# Patient Record
Sex: FEMALE | Race: WHITE | NOT HISPANIC OR LATINO | Employment: FULL TIME | URBAN - METROPOLITAN AREA
[De-identification: names, ages, dates, MRNs, and addresses within clinical notes are randomized per-mention and may not be internally consistent; named-entity substitution may affect disease eponyms.]

---

## 2022-07-08 LAB
HUMAN PAPILLOMAVIRUS (HPV): NORMAL
PAP RECOMMENDATION EXT: NORMAL
PAP SMEAR: NORMAL

## 2023-02-07 ENCOUNTER — OFFICE VISIT (OUTPATIENT)
Dept: PRIMARY CARE CLINIC | Facility: CLINIC | Age: 58
End: 2023-02-07
Payer: COMMERCIAL

## 2023-02-07 VITALS
SYSTOLIC BLOOD PRESSURE: 140 MMHG | DIASTOLIC BLOOD PRESSURE: 92 MMHG | OXYGEN SATURATION: 98 % | WEIGHT: 169.38 LBS | TEMPERATURE: 98 F | HEART RATE: 68 BPM | HEIGHT: 63 IN | BODY MASS INDEX: 30.01 KG/M2

## 2023-02-07 DIAGNOSIS — Z12.12 SCREENING FOR MALIGNANT NEOPLASM OF THE RECTUM: ICD-10-CM

## 2023-02-07 DIAGNOSIS — G44.209 TENSION HEADACHE: ICD-10-CM

## 2023-02-07 DIAGNOSIS — I10 ESSENTIAL HYPERTENSION, BENIGN: Primary | ICD-10-CM

## 2023-02-07 DIAGNOSIS — E03.9 PRIMARY HYPOTHYROIDISM: ICD-10-CM

## 2023-02-07 DIAGNOSIS — R73.9 SERUM GLUCOSE INCREASED: ICD-10-CM

## 2023-02-07 DIAGNOSIS — Z13.220 SCREENING CHOLESTEROL LEVEL: ICD-10-CM

## 2023-02-07 DIAGNOSIS — Z11.59 ENCOUNTER FOR HEPATITIS C SCREENING TEST FOR LOW RISK PATIENT: ICD-10-CM

## 2023-02-07 PROCEDURE — 99214 OFFICE O/P EST MOD 30 MIN: CPT | Mod: S$GLB,,, | Performed by: INTERNAL MEDICINE

## 2023-02-07 PROCEDURE — 3077F SYST BP >= 140 MM HG: CPT | Mod: S$GLB,,, | Performed by: INTERNAL MEDICINE

## 2023-02-07 PROCEDURE — 3008F BODY MASS INDEX DOCD: CPT | Mod: S$GLB,,, | Performed by: INTERNAL MEDICINE

## 2023-02-07 PROCEDURE — 3080F PR MOST RECENT DIASTOLIC BLOOD PRESSURE >= 90 MM HG: ICD-10-PCS | Mod: S$GLB,,, | Performed by: INTERNAL MEDICINE

## 2023-02-07 PROCEDURE — 1160F PR REVIEW ALL MEDS BY PRESCRIBER/CLIN PHARMACIST DOCUMENTED: ICD-10-PCS | Mod: S$GLB,,, | Performed by: INTERNAL MEDICINE

## 2023-02-07 PROCEDURE — 1159F MED LIST DOCD IN RCRD: CPT | Mod: S$GLB,,, | Performed by: INTERNAL MEDICINE

## 2023-02-07 PROCEDURE — 3008F PR BODY MASS INDEX (BMI) DOCUMENTED: ICD-10-PCS | Mod: S$GLB,,, | Performed by: INTERNAL MEDICINE

## 2023-02-07 PROCEDURE — 99214 PR OFFICE/OUTPT VISIT, EST, LEVL IV, 30-39 MIN: ICD-10-PCS | Mod: S$GLB,,, | Performed by: INTERNAL MEDICINE

## 2023-02-07 PROCEDURE — 3080F DIAST BP >= 90 MM HG: CPT | Mod: S$GLB,,, | Performed by: INTERNAL MEDICINE

## 2023-02-07 PROCEDURE — 3077F PR MOST RECENT SYSTOLIC BLOOD PRESSURE >= 140 MM HG: ICD-10-PCS | Mod: S$GLB,,, | Performed by: INTERNAL MEDICINE

## 2023-02-07 PROCEDURE — 1159F PR MEDICATION LIST DOCUMENTED IN MEDICAL RECORD: ICD-10-PCS | Mod: S$GLB,,, | Performed by: INTERNAL MEDICINE

## 2023-02-07 PROCEDURE — 1160F RVW MEDS BY RX/DR IN RCRD: CPT | Mod: S$GLB,,, | Performed by: INTERNAL MEDICINE

## 2023-02-07 RX ORDER — LOSARTAN POTASSIUM 25 MG/1
50 TABLET ORAL DAILY
COMMUNITY
End: 2023-02-07 | Stop reason: DRUGHIGH

## 2023-02-07 RX ORDER — BUTALBITAL, ACETAMINOPHEN, CAFFEINE AND CODEINE PHOSPHATE 300; 50; 40; 30 MG/1; MG/1; MG/1; MG/1
CAPSULE ORAL EVERY 4 HOURS
COMMUNITY
End: 2023-03-03

## 2023-02-07 RX ORDER — LOSARTAN POTASSIUM AND HYDROCHLOROTHIAZIDE 12.5; 5 MG/1; MG/1
1 TABLET ORAL DAILY
Qty: 90 TABLET | Refills: 3 | Status: SHIPPED | OUTPATIENT
Start: 2023-02-07 | End: 2024-02-08 | Stop reason: SDUPTHER

## 2023-02-07 RX ORDER — LEVOTHYROXINE SODIUM 100 UG/1
100 TABLET ORAL
COMMUNITY
End: 2023-10-09 | Stop reason: SDUPTHER

## 2023-02-07 NOTE — PROGRESS NOTES
Subjective:       Patient ID: Tierney Alvarado is a 57 y.o. female.    Chief Complaint: Hypertension (F/u on BP,refills), Thyroid Problem (On 100mcg/Needs labs), and Tension Headache (Few times a year.The current medical regimen is effective;  continue present plan and medications. With fioricets)      Patient is established already .......... presents today for a f/u visit , for management of the chronic conditions.        Hypertension  This is a chronic problem. The current episode started more than 1 year ago. The problem has been gradually worsening since onset. The problem is uncontrolled. Associated symptoms include headaches. There are no associated agents to hypertension. Risk factors for coronary artery disease include dyslipidemia, family history, sedentary lifestyle and obesity. Past treatments include angiotensin blockers. The current treatment provides moderate improvement. Identifiable causes of hypertension include a thyroid problem.   Thyroid Problem    Review of Systems   Neurological:  Positive for headaches.        Rare tension like Has with changes in temp       Objective:      Physical Exam  Vitals and nursing note reviewed.   Constitutional:       Appearance: Normal appearance. She is obese.   Cardiovascular:      Rate and Rhythm: Normal rate and regular rhythm.   Pulmonary:      Effort: Pulmonary effort is normal.      Breath sounds: Normal breath sounds.   Musculoskeletal:      Cervical back: Normal range of motion and neck supple.   Neurological:      Mental Status: She is alert.       Assessment:       1. Essential hypertension, benign  Overview:  Hypertension ROS: taking medications as instructed, no medication side effects noted, no TIA's, no chest pain on exertion, no dyspnea on exertion and no swelling of ankles.   New concerns: none.   Hypertension Exam: BP noted to be well controlled today in office, S1, S2 normal, no gallop, no murmur, chest clear, no JVD, no HSM, no edema.   Lab  review: orders written for new lab studies as appropriate; see orders.   Assessment:  Hypertension poorly controlled.   Plan: Orders and follow up as documented in patient record..      Assessment & Plan:  Add HCZ  Check labs and  sched f/u in few weeks    Orders:  -     losartan-hydrochlorothiazide 50-12.5 mg (HYZAAR) 50-12.5 mg per tablet; Take 1 tablet by mouth once daily.  Dispense: 90 tablet; Refill: 3  -     Comprehensive Metabolic Panel; Future; Expected date: 02/07/2023  -     Microalbumin/Creatinine Ratio, Urine; Future; Expected date: 02/07/2023    2. Screening cholesterol level  -     Lipid Panel; Future; Expected date: 02/07/2023    3. Primary hypothyroidism  Overview:  Thyroid ROS: denies fatigue, weight changes, heat/cold intolerance, bowel/skin changes or CVS symptoms.   No results found for: TSH  Exam: thyroid is normal in size without nodules or tenderness.   Assessment:  hypothyroidism well controlled.   Plan: current treatment plan effective, no change in therapy.      Assessment & Plan:  Recheck TFTs    Orders:  -     TSH; Future; Expected date: 02/07/2023    4. Serum glucose increased  -     Hemoglobin A1C; Future; Expected date: 02/07/2023    5. Encounter for hepatitis C screening test for low risk patient  -     Hepatitis C Antibody; Future; Expected date: 02/07/2023    6. Screening for malignant neoplasm of the rectum  -     Cologuard Screening (Multitarget Stool DNA); Future; Expected date: 02/07/2023    7. Tension headache  Overview:  Happened few times a year with change of temperature with a    Assessment & Plan:  Controlled with small dose of Fioricet               Plan:       1. Essential hypertension, benign  Overview:  Hypertension ROS: taking medications as instructed, no medication side effects noted, no TIA's, no chest pain on exertion, no dyspnea on exertion and no swelling of ankles.   New concerns: none.   Hypertension Exam: BP noted to be well controlled today in office, S1, S2  normal, no gallop, no murmur, chest clear, no JVD, no HSM, no edema.   Lab review: orders written for new lab studies as appropriate; see orders.   Assessment:  Hypertension poorly controlled.   Plan: Orders and follow up as documented in patient record..      Assessment & Plan:  Add HCZ  Check labs and  sched f/u in few weeks    Orders:  -     losartan-hydrochlorothiazide 50-12.5 mg (HYZAAR) 50-12.5 mg per tablet; Take 1 tablet by mouth once daily.  Dispense: 90 tablet; Refill: 3  -     Comprehensive Metabolic Panel; Future; Expected date: 02/07/2023  -     Microalbumin/Creatinine Ratio, Urine; Future; Expected date: 02/07/2023    2. Screening cholesterol level  -     Lipid Panel; Future; Expected date: 02/07/2023    3. Primary hypothyroidism  Overview:  Thyroid ROS: denies fatigue, weight changes, heat/cold intolerance, bowel/skin changes or CVS symptoms.   No results found for: TSH  Exam: thyroid is normal in size without nodules or tenderness.   Assessment:  hypothyroidism well controlled.   Plan: current treatment plan effective, no change in therapy.      Assessment & Plan:  Recheck TFTs    Orders:  -     TSH; Future; Expected date: 02/07/2023    4. Serum glucose increased  -     Hemoglobin A1C; Future; Expected date: 02/07/2023    5. Encounter for hepatitis C screening test for low risk patient  -     Hepatitis C Antibody; Future; Expected date: 02/07/2023    6. Screening for malignant neoplasm of the rectum  -     Cologuard Screening (Multitarget Stool DNA); Future; Expected date: 02/07/2023    7. Tension headache  Overview:  Happened few times a year with change of temperature with a    Assessment & Plan:  Controlled with small dose of Fioricet

## 2023-02-27 ENCOUNTER — CLINICAL SUPPORT (OUTPATIENT)
Dept: PRIMARY CARE CLINIC | Facility: CLINIC | Age: 58
End: 2023-02-27
Payer: COMMERCIAL

## 2023-02-27 DIAGNOSIS — Z11.59 ENCOUNTER FOR HEPATITIS C SCREENING TEST FOR LOW RISK PATIENT: ICD-10-CM

## 2023-02-27 DIAGNOSIS — R73.9 SERUM GLUCOSE INCREASED: ICD-10-CM

## 2023-02-27 DIAGNOSIS — E03.9 PRIMARY HYPOTHYROIDISM: ICD-10-CM

## 2023-02-27 DIAGNOSIS — I10 ESSENTIAL HYPERTENSION, BENIGN: ICD-10-CM

## 2023-02-27 DIAGNOSIS — Z13.220 SCREENING CHOLESTEROL LEVEL: ICD-10-CM

## 2023-02-27 LAB
ALBUMIN SERPL BCP-MCNC: 3.8 G/DL (ref 3.5–5.2)
ALP SERPL-CCNC: 95 U/L (ref 55–135)
ALT SERPL W/O P-5'-P-CCNC: 18 U/L (ref 10–44)
ANION GAP SERPL CALC-SCNC: 7 MMOL/L (ref 8–16)
AST SERPL-CCNC: 18 U/L (ref 10–40)
BILIRUB SERPL-MCNC: 0.3 MG/DL (ref 0.1–1)
BUN SERPL-MCNC: 13 MG/DL (ref 6–20)
CALCIUM SERPL-MCNC: 9.2 MG/DL (ref 8.7–10.5)
CHLORIDE SERPL-SCNC: 107 MMOL/L (ref 95–110)
CHOLEST SERPL-MCNC: 194 MG/DL (ref 120–199)
CHOLEST/HDLC SERPL: 3.4 {RATIO} (ref 2–5)
CO2 SERPL-SCNC: 26 MMOL/L (ref 23–29)
CREAT SERPL-MCNC: 0.8 MG/DL (ref 0.5–1.4)
EST. GFR  (NO RACE VARIABLE): >60 ML/MIN/1.73 M^2
ESTIMATED AVG GLUCOSE: 108 MG/DL (ref 68–131)
GLUCOSE SERPL-MCNC: 98 MG/DL (ref 70–110)
HBA1C MFR BLD: 5.4 % (ref 4–5.6)
HDLC SERPL-MCNC: 57 MG/DL (ref 40–75)
HDLC SERPL: 29.4 % (ref 20–50)
LDLC SERPL CALC-MCNC: 124.4 MG/DL (ref 63–159)
NONHDLC SERPL-MCNC: 137 MG/DL
POTASSIUM SERPL-SCNC: 4.1 MMOL/L (ref 3.5–5.1)
PROT SERPL-MCNC: 6.9 G/DL (ref 6–8.4)
SODIUM SERPL-SCNC: 140 MMOL/L (ref 136–145)
TRIGL SERPL-MCNC: 63 MG/DL (ref 30–150)
TSH SERPL DL<=0.005 MIU/L-ACNC: 1.32 UIU/ML (ref 0.4–4)

## 2023-02-27 PROCEDURE — 84443 ASSAY THYROID STIM HORMONE: CPT | Performed by: INTERNAL MEDICINE

## 2023-02-27 PROCEDURE — 86803 HEPATITIS C AB TEST: CPT | Performed by: INTERNAL MEDICINE

## 2023-02-27 PROCEDURE — 83036 HEMOGLOBIN GLYCOSYLATED A1C: CPT | Performed by: INTERNAL MEDICINE

## 2023-02-27 PROCEDURE — 80053 COMPREHEN METABOLIC PANEL: CPT | Performed by: INTERNAL MEDICINE

## 2023-02-27 PROCEDURE — 80061 LIPID PANEL: CPT | Performed by: INTERNAL MEDICINE

## 2023-02-28 LAB — HCV AB SERPL QL IA: NORMAL

## 2023-03-01 ENCOUNTER — PATIENT MESSAGE (OUTPATIENT)
Dept: ADMINISTRATIVE | Facility: HOSPITAL | Age: 58
End: 2023-03-01
Payer: COMMERCIAL

## 2023-03-01 ENCOUNTER — PATIENT OUTREACH (OUTPATIENT)
Dept: ADMINISTRATIVE | Facility: HOSPITAL | Age: 58
End: 2023-03-01
Payer: COMMERCIAL

## 2023-03-01 NOTE — PROGRESS NOTES
Population Health Outreach.     The following record(s)  below were uploaded for Health Maintenance .    MAMMOGRAM SCREENING            (X) PAP SMEAR  (X) HPV SCREENING   CHLAMYDIA SCREENING    MAMMOGRAM SCREENING    DEXA SCREENING           HEMOGLOBIN A1c  LIPID PANEL  URINE MICROALBUMIN  COMPLETE METABOLIC PANEL  DM FOOT EXAM  DM EYE EXAM    COLONOSCOPY       FIT  KIT  COLOGUARD    HEP C SCREENING  HIV SCREENING    ABDOMINAL AORTA ANEURYSM SCREENING (CT OF ABDOMEN)  LOW DOSE CT SCREENING (CXR)     IMMUNIZATIONS

## 2023-03-03 ENCOUNTER — OFFICE VISIT (OUTPATIENT)
Dept: PRIMARY CARE CLINIC | Facility: CLINIC | Age: 58
End: 2023-03-03
Payer: COMMERCIAL

## 2023-03-03 DIAGNOSIS — U07.1 COVID: ICD-10-CM

## 2023-03-03 PROCEDURE — 1160F PR REVIEW ALL MEDS BY PRESCRIBER/CLIN PHARMACIST DOCUMENTED: ICD-10-PCS | Mod: 95,,, | Performed by: INTERNAL MEDICINE

## 2023-03-03 PROCEDURE — 99213 PR OFFICE/OUTPT VISIT, EST, LEVL III, 20-29 MIN: ICD-10-PCS | Mod: 95,,, | Performed by: INTERNAL MEDICINE

## 2023-03-03 PROCEDURE — 1159F PR MEDICATION LIST DOCUMENTED IN MEDICAL RECORD: ICD-10-PCS | Mod: 95,,, | Performed by: INTERNAL MEDICINE

## 2023-03-03 PROCEDURE — 1160F RVW MEDS BY RX/DR IN RCRD: CPT | Mod: 95,,, | Performed by: INTERNAL MEDICINE

## 2023-03-03 PROCEDURE — 1159F MED LIST DOCD IN RCRD: CPT | Mod: 95,,, | Performed by: INTERNAL MEDICINE

## 2023-03-03 PROCEDURE — 99213 OFFICE O/P EST LOW 20 MIN: CPT | Mod: 95,,, | Performed by: INTERNAL MEDICINE

## 2023-03-03 PROCEDURE — 3044F HG A1C LEVEL LT 7.0%: CPT | Mod: 95,,, | Performed by: INTERNAL MEDICINE

## 2023-03-03 PROCEDURE — 3044F PR MOST RECENT HEMOGLOBIN A1C LEVEL <7.0%: ICD-10-PCS | Mod: 95,,, | Performed by: INTERNAL MEDICINE

## 2023-03-03 NOTE — PROGRESS NOTES
Subjective:       Patient ID: Tierney Alvarado is a 57 y.o. female.    Chief Complaint: URI (She presents via telehealth with complaints of Cough , congestion, low grade fever 3 days ago ...improving/Home COVID test was + 2 days ago)      The patient location is: Home  The chief complaint leading to consultation is: COVID    Visit type: audiovisual    Face to Face time with patient: 15  10 minutes of total time spent on the encounter, which includes face to face time and non-face to face time preparing to see the patient (eg, review of tests), Obtaining and/or reviewing separately obtained history, Documenting clinical information in the electronic or other health record, Independently interpreting results (not separately reported) and communicating results to the patient/family/caregiver, or Care coordination (not separately reported).         Each patient to whom he or she provides medical services by telemedicine is:  (1) informed of the relationship between the physician and patient and the respective role of any other health care provider with respect to management of the patient; and (2) notified that he or she may decline to receive medical services by telemedicine and may withdraw from such care at any time.    Notes:         URI   This is a new problem. The current episode started in the past 7 days. The problem has been rapidly improving. The maximum temperature recorded prior to her arrival was 102 - 102.9 F. Associated symptoms include congestion and coughing. She has tried decongestant and antihistamine for the symptoms. The treatment provided moderate relief.   Review of Systems   HENT:  Positive for nasal congestion.    Respiratory:  Positive for cough.        Objective:      Physical Exam  : deferred b/o telehealth  Assessment:       1. COVID  Overview:  .Upper Respiratory Infection: Patient complains of symptoms of a URI. Symptoms include congestion, cough and fever. Onset of symptoms was 5 days  ago, rapidly improving since that time. She also c/o congestion and non productive cough for the past 5 days .  She is drinking plenty of fluids. Evaluation to date: none. Treatment to date: antihistamines and decongestants.        Assessment & Plan:  In over-the-counter medications for cold and sinus like DayQuil NyQuil  Increase intake of fluids  Return to clinic for follow-up in 3 days  Off work till then  Self quarantine and isolation   Universal health precautions               Plan:       1. COVID  Overview:  .Upper Respiratory Infection: Patient complains of symptoms of a URI. Symptoms include congestion, cough and fever. Onset of symptoms was 5 days ago, rapidly improving since that time. She also c/o congestion and non productive cough for the past 5 days .  She is drinking plenty of fluids. Evaluation to date: none. Treatment to date: antihistamines and decongestants.        Assessment & Plan:  In over-the-counter medications for cold and sinus like DayQuil NyQuil  Increase intake of fluids  Return to clinic for follow-up in 3 days  Off work till then  Self quarantine and isolation   Universal health precautions      I spent a total of 15 minutes on the day of the visit.This includes face to face time and non-face to face time preparing to see the patient (eg, review of tests), obtaining and/or reviewing separately obtained history, documenting clinical information in the electronic or other health record, independently interpreting results and communicating results to the patient/family/caregiver, or care coordinator.

## 2023-03-03 NOTE — ASSESSMENT & PLAN NOTE
In over-the-counter medications for cold and sinus like DayQuil NyQuil  Increase intake of fluids  Return to clinic for follow-up in 3 days  Off work till then  Self quarantine and isolation   Universal health precautions

## 2023-03-06 ENCOUNTER — OFFICE VISIT (OUTPATIENT)
Dept: PRIMARY CARE CLINIC | Facility: CLINIC | Age: 58
End: 2023-03-06
Payer: COMMERCIAL

## 2023-03-06 DIAGNOSIS — Z12.31 ENCOUNTER FOR SCREENING MAMMOGRAM FOR MALIGNANT NEOPLASM OF BREAST: Primary | ICD-10-CM

## 2023-03-06 DIAGNOSIS — G44.209 TENSION HEADACHE: ICD-10-CM

## 2023-03-06 DIAGNOSIS — Z00.00 WELLNESS EXAMINATION: ICD-10-CM

## 2023-03-06 PROCEDURE — 3044F PR MOST RECENT HEMOGLOBIN A1C LEVEL <7.0%: ICD-10-PCS | Mod: 95,,, | Performed by: INTERNAL MEDICINE

## 2023-03-06 PROCEDURE — 99212 PR OFFICE/OUTPT VISIT, EST, LEVL II, 10-19 MIN: ICD-10-PCS | Mod: 25,95,, | Performed by: INTERNAL MEDICINE

## 2023-03-06 PROCEDURE — 99396 PR PREVENTIVE VISIT,EST,40-64: ICD-10-PCS | Mod: 95,,, | Performed by: INTERNAL MEDICINE

## 2023-03-06 RX ORDER — BUTALBITAL, ASPIRIN, AND CAFFEINE 325; 50; 40 MG/1; MG/1; MG/1
1 CAPSULE ORAL DAILY
Qty: 42 CAPSULE | Refills: 0 | Status: SHIPPED | OUTPATIENT
Start: 2023-03-06 | End: 2023-04-17

## 2023-03-06 RX ORDER — BUTALBITAL, ASPIRIN, AND CAFFEINE 325; 50; 40 MG/1; MG/1; MG/1
1 CAPSULE ORAL DAILY
COMMUNITY
End: 2023-03-06 | Stop reason: SDUPTHER

## 2023-03-07 NOTE — ASSESSMENT & PLAN NOTE
I recommended flu vaccine, COVID booster & shingrix   C-scope : never done........Cologuard kit received  Add HIV to next labs  Order mammogram today at [facility]

## 2023-03-07 NOTE — PROGRESS NOTES
Subjective:       Patient ID: Tierney Alvarado is a 57 y.o. female.    Chief Complaint: Annual Exam      Patient is established already .......... presents today for a Wellness visit , to discuss labs results , COVID    Patient presents for a wellness visit  No new c/o today  Please refer to initial intake notes for screening/preventive measures  All histories and immunization schedule reviewed with patient            URI   This is a new problem. The current episode started in the past 7 days. The problem has been resolved. There has been no fever. Pertinent negatives include no congestion or coughing. She has tried acetaminophen, increased fluids and NSAIDs for the symptoms. The treatment provided significant relief.   Review of Systems   Constitutional:  Negative for activity change, fever and unexpected weight change.   HENT:  Negative for nasal congestion.    Respiratory: Negative.  Negative for cough.    Cardiovascular: Negative.    Neurological: Negative.        Objective:      Physical Exam  deferred b/o tele  Assessment:       1. Encounter for screening mammogram for malignant neoplasm of breast    2. Wellness examination  Overview:  Patient presents for a wellness visit  No new c/o today  Please refer to initial intake notes for screening/preventive measures  All histories and immunization schedule reviewed with patient        Assessment & Plan:  I recommended flu vaccine, COVID booster & shingrix   C-scope : never done........Cologuard kit received  Add HIV to next labs  Order mammogram today at [facility]               Plan:       1. Encounter for screening mammogram for malignant neoplasm of breast    2. Wellness examination  Overview:  Patient presents for a wellness visit  No new c/o today  Please refer to initial intake notes for screening/preventive measures  All histories and immunization schedule reviewed with patient        Assessment & Plan:  I recommended flu vaccine, COVID booster & shingrix    C-scope : never done........Cologuard kit received  Add HIV to next labs  Order mammogram today at [facility]

## 2023-04-11 ENCOUNTER — PATIENT MESSAGE (OUTPATIENT)
Dept: ADMINISTRATIVE | Facility: HOSPITAL | Age: 58
End: 2023-04-11
Payer: COMMERCIAL

## 2023-06-05 PROBLEM — Z00.00 WELLNESS EXAMINATION: Status: RESOLVED | Noted: 2023-03-06 | Resolved: 2023-06-05

## 2023-06-19 ENCOUNTER — PATIENT OUTREACH (OUTPATIENT)
Dept: ADMINISTRATIVE | Facility: HOSPITAL | Age: 58
End: 2023-06-19
Payer: COMMERCIAL

## 2023-06-19 ENCOUNTER — PATIENT MESSAGE (OUTPATIENT)
Dept: ADMINISTRATIVE | Facility: HOSPITAL | Age: 58
End: 2023-06-19
Payer: COMMERCIAL

## 2023-06-19 NOTE — PROGRESS NOTES
Population Health Chart Review & Patient Outreach Details:     Reason for Outreach Encounter:     [x]  Non-Compliant Report   []  Payor Report ([employer], PHN, BCBS, MSSP, MCIP, UHC, etc.)   []  Pre-Visit Chart Review     Updates Requested / Reviewed:     [x]  Care Everywhere    [x]     []  External Sources ([facility], [facility], DIS, etc.)   []  Care Team Updated    Patient Outreach Method:    [x]  Telephone Outreach Completed   [] Successful   [x] Left Voicemail   [] Unable to Contact (wrong number, no voicemail)  [x]  [facility] Outreach Sent  []  Letter Outreach Mailed  [x]  Fax Sent for External Records-[facility]  []  External Records Upload    Health Maintenance Topics Addressed and Outreach Outcomes / Actions Taken:        [x]      Breast Cancer Screening []  Mammo Scheduled      [x]  External Records Requested     []  Added Reminder to Complete to Upcoming Primary Care Appt Notes     []  Patient Declined     []  Patient Will Call Back to Schedule     []  Patient Will Schedule with External Provider / Order Routed if Applicable             []       Cervical Cancer Screening []  Pap Scheduled      []  External Records Requested     []  Added Reminder to Complete to Upcoming Primary Care Appt Notes     []  Patient Declined     []  Patient Will Call Back to Schedule     []  Patient Will Schedule with External Provider               []          Colorectal Cancer Screening []  Colonoscopy Case Request or Referral Placed     []  External Records Requested     []  Added Reminder to Complete to Upcoming Primary Care Appt Notes     []  Patient Declined     []  Patient Will Call Back to Schedule     []  Patient Will Schedule with External Provider     []  Fit Kit Mailed (add the SmartPhrase under additional notes)     []  Reminded Patient to Complete Home Test             []      Diabetic Eye Exam []  Eye Camera Scheduled or Optometry Referral Placed     []  External Records Requested     []  Added Reminder to  Complete to Upcoming Primary Care Appt Notes     []  Patient Declined     []  Patient Will Call Back to Schedule     []  Patient Will Schedule with External Provider             []      Blood Pressure Control []  Primary Care Follow Up Visit Scheduled     []  Remote Blood Pressure Reading Captured     []  Added Reminder to Complete to Upcoming Primary Care Appt Notes     []  Patient Declined     []  Patient Will Call Back / Patient Will Send Portal Message with Reading     []  Patient Will Call Back to Schedule Provider Visit             []       HbA1c & Other Labs []  Lab Appt Scheduled for Due Labs     []  Primary Care Follow Up Visit Scheduled      []  Reminded Patient to Complete Home Test     []  Added Reminder to Complete to Upcoming Primary Care Appt Notes     []  Patient Declined     []  Patient Will Call Back to Schedule     []  Patient Will Schedule with External Provider / Order Routed if Applicable           []    Schedule Primary Care Appt []  Primary Care Appt Scheduled     []  Patient Declined     []  Patient Will Call Back to Schedule     []  Pt Established with External Provider & Updated Care Team             []      Medication Adherence []  Primary Care Appointment Scheduled     []  Added Reminder to Upcoming Primary Care Appt Notes     []  Patient Reminded to  Prescription     []  Patient Declined, Provider Notified if Needed     []  Sent Provider Message to Review and/or Add Exclusion to Problem List             []      Osteoporosis Screening []  DXA Appointment Scheduled     []  External Records Requested     []  Added Reminder to Complete to Upcoming Primary Care Appt Notes     []  Patient Declined     []  Patient Will Call Back to Schedule     []  Patient Will Schedule with External Provider / Order Routed if Applicable     Additional Care Coordinator Notes:         Further Action Needed If Patient Returns Outreach:

## 2023-06-19 NOTE — LETTER
AUTHORIZATION FOR RELEASE OF   CONFIDENTIAL INFORMATION    Dear [facility],    We are seeing Tierney Alvarado, date of birth 1965, in the clinic at [facility]. Juancho Medina MD is the patient's PCP. Tierney Alvarado has an outstanding lab/procedure at the time we reviewed her chart. In order to help keep her health information updated, she has authorized us to request the following medical record(s):        ( X)  MAMMOGRAM [id number]                  (  )  COLONOSCOPY      (  )  PAP SMEAR                                          (  )  OUTSIDE LAB RESULTS     (  )  DEXA SCAN                                          (  )  EYE EXAM            (  )  FOOT EXAM                                          (  )  ENTIRE RECORD     (  )  OUTSIDE IMMUNIZATIONS                 (  )  _______________         Please fax records to [facility], Magdy Gorgi-Mikhail, MD, [phone].     If you have any questions, please contact:    EUSEBIO Lindsay N Care Coordinator  [facility]  Phone: [phone]  FAX: [phone]           Patient Name: Tierney Alvarado  : 1965  Patient Phone #: [phone]

## 2023-09-13 ENCOUNTER — PATIENT MESSAGE (OUTPATIENT)
Dept: FAMILY MEDICINE | Facility: CLINIC | Age: 58
End: 2023-09-13
Payer: COMMERCIAL

## 2023-10-09 ENCOUNTER — OFFICE VISIT (OUTPATIENT)
Dept: FAMILY MEDICINE | Facility: CLINIC | Age: 58
End: 2023-10-09
Payer: COMMERCIAL

## 2023-10-09 VITALS
HEIGHT: 63 IN | TEMPERATURE: 98 F | HEART RATE: 73 BPM | DIASTOLIC BLOOD PRESSURE: 65 MMHG | OXYGEN SATURATION: 97 % | SYSTOLIC BLOOD PRESSURE: 110 MMHG | WEIGHT: 171 LBS | BODY MASS INDEX: 30.3 KG/M2

## 2023-10-09 DIAGNOSIS — Z13.220 ENCOUNTER FOR SCREENING FOR LIPID DISORDER: ICD-10-CM

## 2023-10-09 DIAGNOSIS — R73.9 ELEVATED BLOOD SUGAR: ICD-10-CM

## 2023-10-09 DIAGNOSIS — Z13.1 DIABETES MELLITUS SCREENING: ICD-10-CM

## 2023-10-09 DIAGNOSIS — I10 ESSENTIAL HYPERTENSION, BENIGN: ICD-10-CM

## 2023-10-09 DIAGNOSIS — E03.9 PRIMARY HYPOTHYROIDISM: ICD-10-CM

## 2023-10-09 DIAGNOSIS — G44.209 TENSION HEADACHE: ICD-10-CM

## 2023-10-09 DIAGNOSIS — Z00.00 PREVENTATIVE HEALTH CARE: ICD-10-CM

## 2023-10-09 DIAGNOSIS — Z12.31 SCREENING MAMMOGRAM FOR HIGH-RISK PATIENT: Primary | ICD-10-CM

## 2023-10-09 PROCEDURE — 3078F PR MOST RECENT DIASTOLIC BLOOD PRESSURE < 80 MM HG: ICD-10-PCS | Mod: S$GLB,,, | Performed by: INTERNAL MEDICINE

## 2023-10-09 PROCEDURE — 3008F BODY MASS INDEX DOCD: CPT | Mod: S$GLB,,, | Performed by: INTERNAL MEDICINE

## 2023-10-09 PROCEDURE — 1159F MED LIST DOCD IN RCRD: CPT | Mod: S$GLB,,, | Performed by: INTERNAL MEDICINE

## 2023-10-09 PROCEDURE — 3078F DIAST BP <80 MM HG: CPT | Mod: S$GLB,,, | Performed by: INTERNAL MEDICINE

## 2023-10-09 PROCEDURE — 3044F HG A1C LEVEL LT 7.0%: CPT | Mod: S$GLB,,, | Performed by: INTERNAL MEDICINE

## 2023-10-09 PROCEDURE — 3008F PR BODY MASS INDEX (BMI) DOCUMENTED: ICD-10-PCS | Mod: S$GLB,,, | Performed by: INTERNAL MEDICINE

## 2023-10-09 PROCEDURE — 3074F SYST BP LT 130 MM HG: CPT | Mod: S$GLB,,, | Performed by: INTERNAL MEDICINE

## 2023-10-09 PROCEDURE — 99214 PR OFFICE/OUTPT VISIT, EST, LEVL IV, 30-39 MIN: ICD-10-PCS | Mod: S$GLB,,, | Performed by: INTERNAL MEDICINE

## 2023-10-09 PROCEDURE — 99214 OFFICE O/P EST MOD 30 MIN: CPT | Mod: S$GLB,,, | Performed by: INTERNAL MEDICINE

## 2023-10-09 PROCEDURE — 1159F PR MEDICATION LIST DOCUMENTED IN MEDICAL RECORD: ICD-10-PCS | Mod: S$GLB,,, | Performed by: INTERNAL MEDICINE

## 2023-10-09 PROCEDURE — 3074F PR MOST RECENT SYSTOLIC BLOOD PRESSURE < 130 MM HG: ICD-10-PCS | Mod: S$GLB,,, | Performed by: INTERNAL MEDICINE

## 2023-10-09 PROCEDURE — 3044F PR MOST RECENT HEMOGLOBIN A1C LEVEL <7.0%: ICD-10-PCS | Mod: S$GLB,,, | Performed by: INTERNAL MEDICINE

## 2023-10-09 RX ORDER — LEVOTHYROXINE SODIUM 100 UG/1
100 TABLET ORAL
Qty: 90 TABLET | Refills: 3 | Status: SHIPPED | OUTPATIENT
Start: 2023-10-09 | End: 2024-10-08

## 2023-10-09 RX ORDER — BUTALBITAL, ACETAMINOPHEN AND CAFFEINE 50; 325; 40 MG/1; MG/1; MG/1
1 TABLET ORAL EVERY 4 HOURS PRN
COMMUNITY
End: 2023-10-09 | Stop reason: SDUPTHER

## 2023-10-09 RX ORDER — BUTALBITAL, ACETAMINOPHEN AND CAFFEINE 50; 325; 40 MG/1; MG/1; MG/1
1 TABLET ORAL EVERY 4 HOURS PRN
Qty: 42 TABLET | Refills: 0 | Status: SHIPPED | OUTPATIENT
Start: 2023-10-09 | End: 2024-10-08

## 2023-10-09 RX ORDER — LEVOTHYROXINE SODIUM 100 UG/1
100 TABLET ORAL
Qty: 90 TABLET | Refills: 3 | Status: SHIPPED | OUTPATIENT
Start: 2023-10-09 | End: 2023-10-09

## 2023-10-09 RX ORDER — LEVOTHYROXINE SODIUM 100 UG/1
100 TABLET ORAL
COMMUNITY
End: 2023-10-09

## 2023-10-09 NOTE — Clinical Note
She'll send the cologuard kit Mammogram : re-ordered for [facility]  I gave the patient written recommendations re the outstanding vaccinations danial COVID , flu & shingles

## 2023-10-09 NOTE — PROGRESS NOTES
Subjective:       Patient ID: Tierney Alvarado is a 58 y.o. female.    Chief Complaint: Medication Refill      Patient is established already .......... presents today for a f/u visit , to discuss new labs refills and for management of the chronic conditions danial thyroid & tension HAs        Medication Refill  This is a chronic problem. The current episode started more than 1 year ago. The problem has been unchanged. Associated symptoms include headaches. The symptoms are aggravated by stress (Weather changes). She has tried acetaminophen (Fioricets) for the symptoms. The treatment provided significant relief.   Thyroid Problem  Presents for follow-up visit. The symptoms have been stable.     Review of Systems   Neurological:  Positive for headaches.         Objective:      Physical Exam  Vitals and nursing note reviewed.   Constitutional:       Appearance: Normal appearance.   Cardiovascular:      Rate and Rhythm: Normal rate and regular rhythm.   Pulmonary:      Effort: Pulmonary effort is normal.      Breath sounds: Normal breath sounds.   Musculoskeletal:      Cervical back: Normal range of motion and neck supple.   Neurological:      Mental Status: She is alert.         Assessment:       1. Screening mammogram for high-risk patient  -     Mammo Digital Screening Bilat w/ Johnathan; Future; Expected date: 10/09/2023    2. Diabetes mellitus screening    3. Essential hypertension, benign  Overview:  Hypertension ROS: taking medications as instructed, no medication side effects noted, no TIA's, no chest pain on exertion, no dyspnea on exertion and no swelling of ankles.   New concerns: none.   Hypertension Exam: BP noted to be well controlled today in office, S1, S2 normal, no gallop, no murmur, chest clear, no JVD, no HSM, no edema.   Lab review: orders written for new lab studies as appropriate; see orders.   Assessment:  Hypertension poorly controlled.   Plan: Orders and follow up as documented in patient  record..      Orders:  -     Comprehensive Metabolic Panel; Future; Expected date: 10/09/2023  -     Microalbumin/Creatinine Ratio, Urine; Future; Expected date: 10/09/2023    4. Primary hypothyroidism  Overview:  Thyroid ROS: denies fatigue, weight changes, heat/cold intolerance, bowel/skin changes or CVS symptoms.   No results found for: TSH  Exam: thyroid is normal in size without nodules or tenderness.   Assessment:  hypothyroidism well controlled.   Plan: current treatment plan effective, no change in therapy.      Assessment & Plan:  Refill synthroid  Brand name  Recheck labs in 3-4 M    Orders:  -     TSH; Future; Expected date: 10/09/2023    5. Elevated blood sugar  -     Hemoglobin A1C; Future; Expected date: 10/09/2023    6. Encounter for screening for lipid disorder  -     Lipid Panel; Future; Expected date: 10/09/2023    7. Tension headache  Overview:  Happened few times a year with change of temperature with a    Assessment & Plan:  Refill fioricet      8. Preventative health care  Overview:  Patient presents for a wellness visit  No new c/o today  Please refer to initial intake notes for screening/preventive measures  All histories and immunization schedule reviewed with patient        Assessment & Plan:  Get COVID, flu & shingrix  Get mammo sched at [facility]  I gave her an order  I asked her to send the [employer] kit      Other orders  -     levothyroxine (SYNTHROID) 100 MCG tablet; Take 1 tablet (100 mcg total) by mouth before breakfast.  Dispense: 90 tablet; Refill: 3  -     butalbital-acetaminophen-caffeine -40 mg (FIORICET, ESGIC) -40 mg per tablet; Take 1 tablet by mouth every 4 (four) hours as needed for Headaches.  Dispense: 42 tablet; Refill: 0           Plan:       1. Screening mammogram for high-risk patient  -     Mammo Digital Screening Bilat w/ Johnathan; Future; Expected date: 10/09/2023    2. Diabetes mellitus screening    3. Essential hypertension, benign  Overview:  Hypertension  ROS: taking medications as instructed, no medication side effects noted, no TIA's, no chest pain on exertion, no dyspnea on exertion and no swelling of ankles.   New concerns: none.   Hypertension Exam: BP noted to be well controlled today in office, S1, S2 normal, no gallop, no murmur, chest clear, no JVD, no HSM, no edema.   Lab review: orders written for new lab studies as appropriate; see orders.   Assessment:  Hypertension poorly controlled.   Plan: Orders and follow up as documented in patient record..      Orders:  -     Comprehensive Metabolic Panel; Future; Expected date: 10/09/2023  -     Microalbumin/Creatinine Ratio, Urine; Future; Expected date: 10/09/2023    4. Primary hypothyroidism  Overview:  Thyroid ROS: denies fatigue, weight changes, heat/cold intolerance, bowel/skin changes or CVS symptoms.   No results found for: TSH  Exam: thyroid is normal in size without nodules or tenderness.   Assessment:  hypothyroidism well controlled.   Plan: current treatment plan effective, no change in therapy.      Assessment & Plan:  Refill synthroid  Brand name  Recheck labs in 3-4 M    Orders:  -     TSH; Future; Expected date: 10/09/2023    5. Elevated blood sugar  -     Hemoglobin A1C; Future; Expected date: 10/09/2023    6. Encounter for screening for lipid disorder  -     Lipid Panel; Future; Expected date: 10/09/2023    7. Tension headache  Overview:  Happened few times a year with change of temperature with a    Assessment & Plan:  Refill fioricet      8. Preventative health care  Overview:  Patient presents for a wellness visit  No new c/o today  Please refer to initial intake notes for screening/preventive measures  All histories and immunization schedule reviewed with patient        Assessment & Plan:  Get COVID, flu & shingrix  Get mammo sched at [facility]  I gave her an order  I asked her to send the cologuard kit      Other orders  -     levothyroxine (SYNTHROID) 100 MCG tablet; Take 1 tablet (100 mcg  total) by mouth before breakfast.  Dispense: 90 tablet; Refill: 3  -     butalbital-acetaminophen-caffeine -40 mg (FIORICET, ESGIC) -40 mg per tablet; Take 1 tablet by mouth every 4 (four) hours as needed for Headaches.  Dispense: 42 tablet; Refill: 0

## 2023-10-09 NOTE — ASSESSMENT & PLAN NOTE
Get COVID, flu & shingrix  Get mammo sched at [facility]  I gave her an order  I asked her to send the cologuard kit

## 2023-10-24 ENCOUNTER — PATIENT MESSAGE (OUTPATIENT)
Dept: ADMINISTRATIVE | Facility: HOSPITAL | Age: 58
End: 2023-10-24
Payer: COMMERCIAL

## 2023-12-02 ENCOUNTER — PATIENT MESSAGE (OUTPATIENT)
Dept: FAMILY MEDICINE | Facility: CLINIC | Age: 58
End: 2023-12-02
Payer: COMMERCIAL

## 2024-01-05 ENCOUNTER — PATIENT MESSAGE (OUTPATIENT)
Dept: ADMINISTRATIVE | Facility: HOSPITAL | Age: 59
End: 2024-01-05
Payer: COMMERCIAL

## 2024-01-08 ENCOUNTER — PATIENT MESSAGE (OUTPATIENT)
Dept: FAMILY MEDICINE | Facility: CLINIC | Age: 59
End: 2024-01-08
Payer: COMMERCIAL

## 2024-01-08 DIAGNOSIS — R19.5 ABNORMAL STOOL TEST: Primary | ICD-10-CM

## 2024-01-08 PROBLEM — Z00.00 PREVENTATIVE HEALTH CARE: Status: RESOLVED | Noted: 2023-03-06 | Resolved: 2024-01-08

## 2024-01-08 LAB — NONINV COLON CA DNA+OCC BLD SCRN STL QL: POSITIVE

## 2024-02-05 ENCOUNTER — LAB VISIT (OUTPATIENT)
Dept: LAB | Facility: CLINIC | Age: 59
End: 2024-02-05
Payer: COMMERCIAL

## 2024-02-05 DIAGNOSIS — R73.9 ELEVATED BLOOD SUGAR: ICD-10-CM

## 2024-02-05 DIAGNOSIS — I10 ESSENTIAL HYPERTENSION, BENIGN: ICD-10-CM

## 2024-02-05 DIAGNOSIS — E03.9 PRIMARY HYPOTHYROIDISM: ICD-10-CM

## 2024-02-05 DIAGNOSIS — Z13.220 ENCOUNTER FOR SCREENING FOR LIPID DISORDER: ICD-10-CM

## 2024-02-05 LAB
ALBUMIN SERPL BCP-MCNC: 3.5 G/DL (ref 3.5–5.2)
ALBUMIN/CREAT UR: 5.9 UG/MG (ref 0–30)
ALP SERPL-CCNC: 92 U/L (ref 55–135)
ALT SERPL W/O P-5'-P-CCNC: 15 U/L (ref 10–44)
ANION GAP SERPL CALC-SCNC: 11 MMOL/L (ref 8–16)
AST SERPL-CCNC: 18 U/L (ref 10–40)
BILIRUB SERPL-MCNC: 0.3 MG/DL (ref 0.1–1)
BUN SERPL-MCNC: 12 MG/DL (ref 6–20)
CALCIUM SERPL-MCNC: 8.7 MG/DL (ref 8.7–10.5)
CHLORIDE SERPL-SCNC: 106 MMOL/L (ref 95–110)
CHOLEST SERPL-MCNC: 177 MG/DL (ref 120–199)
CHOLEST/HDLC SERPL: 4 {RATIO} (ref 2–5)
CO2 SERPL-SCNC: 22 MMOL/L (ref 23–29)
CREAT SERPL-MCNC: 0.7 MG/DL (ref 0.5–1.4)
CREAT UR-MCNC: 204 MG/DL (ref 15–325)
EST. GFR  (NO RACE VARIABLE): >60 ML/MIN/1.73 M^2
ESTIMATED AVG GLUCOSE: 103 MG/DL (ref 68–131)
GLUCOSE SERPL-MCNC: 105 MG/DL (ref 70–110)
HBA1C MFR BLD: 5.2 % (ref 4–5.6)
HDLC SERPL-MCNC: 44 MG/DL (ref 40–75)
HDLC SERPL: 24.9 % (ref 20–50)
LDLC SERPL CALC-MCNC: 114.4 MG/DL (ref 63–159)
MICROALBUMIN UR DL<=1MG/L-MCNC: 12 UG/ML
NONHDLC SERPL-MCNC: 133 MG/DL
POTASSIUM SERPL-SCNC: 3.9 MMOL/L (ref 3.5–5.1)
PROT SERPL-MCNC: 6.5 G/DL (ref 6–8.4)
SODIUM SERPL-SCNC: 139 MMOL/L (ref 136–145)
TRIGL SERPL-MCNC: 93 MG/DL (ref 30–150)
TSH SERPL DL<=0.005 MIU/L-ACNC: 1.47 UIU/ML (ref 0.4–4)

## 2024-02-05 PROCEDURE — 80053 COMPREHEN METABOLIC PANEL: CPT | Performed by: INTERNAL MEDICINE

## 2024-02-05 PROCEDURE — 83036 HEMOGLOBIN GLYCOSYLATED A1C: CPT | Performed by: INTERNAL MEDICINE

## 2024-02-05 PROCEDURE — 80061 LIPID PANEL: CPT | Performed by: INTERNAL MEDICINE

## 2024-02-05 PROCEDURE — 84443 ASSAY THYROID STIM HORMONE: CPT | Performed by: INTERNAL MEDICINE

## 2024-02-05 PROCEDURE — 36415 COLL VENOUS BLD VENIPUNCTURE: CPT | Mod: ,,, | Performed by: INTERNAL MEDICINE

## 2024-02-05 PROCEDURE — 82043 UR ALBUMIN QUANTITATIVE: CPT | Performed by: INTERNAL MEDICINE

## 2024-02-08 ENCOUNTER — OFFICE VISIT (OUTPATIENT)
Dept: FAMILY MEDICINE | Facility: CLINIC | Age: 59
End: 2024-02-08
Payer: COMMERCIAL

## 2024-02-08 VITALS
WEIGHT: 169.19 LBS | SYSTOLIC BLOOD PRESSURE: 130 MMHG | DIASTOLIC BLOOD PRESSURE: 80 MMHG | HEIGHT: 63 IN | OXYGEN SATURATION: 97 % | TEMPERATURE: 99 F | HEART RATE: 69 BPM | BODY MASS INDEX: 29.98 KG/M2

## 2024-02-08 DIAGNOSIS — Z23 NEED FOR PROPHYLACTIC VACCINATION AND INOCULATION AGAINST INFLUENZA: Primary | ICD-10-CM

## 2024-02-08 DIAGNOSIS — R19.5 ABNORMAL STOOL TEST: ICD-10-CM

## 2024-02-08 DIAGNOSIS — I10 ESSENTIAL HYPERTENSION, BENIGN: ICD-10-CM

## 2024-02-08 DIAGNOSIS — Z00.00 ROUTINE MEDICAL EXAM: ICD-10-CM

## 2024-02-08 PROCEDURE — 3044F HG A1C LEVEL LT 7.0%: CPT | Mod: S$GLB,,, | Performed by: INTERNAL MEDICINE

## 2024-02-08 PROCEDURE — 1159F MED LIST DOCD IN RCRD: CPT | Mod: S$GLB,,, | Performed by: INTERNAL MEDICINE

## 2024-02-08 PROCEDURE — 3008F BODY MASS INDEX DOCD: CPT | Mod: S$GLB,,, | Performed by: INTERNAL MEDICINE

## 2024-02-08 PROCEDURE — 3079F DIAST BP 80-89 MM HG: CPT | Mod: S$GLB,,, | Performed by: INTERNAL MEDICINE

## 2024-02-08 PROCEDURE — 1160F RVW MEDS BY RX/DR IN RCRD: CPT | Mod: S$GLB,,, | Performed by: INTERNAL MEDICINE

## 2024-02-08 PROCEDURE — 90686 IIV4 VACC NO PRSV 0.5 ML IM: CPT | Mod: S$GLB,,, | Performed by: INTERNAL MEDICINE

## 2024-02-08 PROCEDURE — 3061F NEG MICROALBUMINURIA REV: CPT | Mod: S$GLB,,, | Performed by: INTERNAL MEDICINE

## 2024-02-08 PROCEDURE — 99396 PREV VISIT EST AGE 40-64: CPT | Mod: 25,S$GLB,, | Performed by: INTERNAL MEDICINE

## 2024-02-08 PROCEDURE — 3066F NEPHROPATHY DOC TX: CPT | Mod: S$GLB,,, | Performed by: INTERNAL MEDICINE

## 2024-02-08 PROCEDURE — 90471 IMMUNIZATION ADMIN: CPT | Mod: S$GLB,,, | Performed by: INTERNAL MEDICINE

## 2024-02-08 PROCEDURE — 3075F SYST BP GE 130 - 139MM HG: CPT | Mod: S$GLB,,, | Performed by: INTERNAL MEDICINE

## 2024-02-08 RX ORDER — LOSARTAN POTASSIUM AND HYDROCHLOROTHIAZIDE 12.5; 5 MG/1; MG/1
1 TABLET ORAL DAILY
Qty: 90 TABLET | Refills: 3 | Status: SHIPPED | OUTPATIENT
Start: 2024-02-08 | End: 2025-02-07

## 2024-02-08 NOTE — ASSESSMENT & PLAN NOTE
I gave the patient written recommendations re the outstanding vaccinations.  Flu vaccine given today ?  I gave her an order for a Mammo

## 2024-02-08 NOTE — PROGRESS NOTES
Subjective:       Patient ID: Tierney Alvarado is a 58 y.o. female.    Chief Complaint: Annual Exam and Foot Pain      Patient presents for a wellness visit  No new c/o today  Please refer to initial intake notes for screening/preventive measures  All histories and immunization schedule reviewed with patient    Plus f/u on labs , cologuard    Foot Pain  Pertinent negatives include no fever.   Follow-up  This is a chronic problem. The current episode started more than 1 year ago. The problem occurs intermittently. The problem has been unchanged. Pertinent negatives include no fever. Nothing aggravates the symptoms. Treatments tried: See MAR.     Review of Systems   Constitutional:  Negative for activity change, fever and unexpected weight change.   Respiratory: Negative.     Cardiovascular: Negative.    Musculoskeletal:  Positive for leg pain.   Neurological: Negative.          Objective:      Physical Exam  Vitals and nursing note reviewed.   Constitutional:       Appearance: Normal appearance.   Cardiovascular:      Rate and Rhythm: Normal rate and regular rhythm.   Pulmonary:      Effort: Pulmonary effort is normal.      Breath sounds: Normal breath sounds.   Musculoskeletal:      Cervical back: Normal range of motion and neck supple.   Neurological:      Mental Status: She is alert.         Assessment:       1. Need for prophylactic vaccination and inoculation against influenza  -     Influenza - Quadrivalent *Preferred* (6 months+) (PF)    2. Essential hypertension, benign  Overview:  Hypertension ROS: taking medications as instructed, no medication side effects noted, no TIA's, no chest pain on exertion, no dyspnea on exertion and no swelling of ankles.   New concerns: none.   Hypertension Exam: BP noted to be well controlled today in office, S1, S2 normal, no gallop, no murmur, chest clear, no JVD, no HSM, no edema.   Lab review: orders written for new lab studies as appropriate; see orders.   Assessment:   Hypertension poorly controlled.   Plan: Orders and follow up as documented in patient record..      Orders:  -     losartan-hydrochlorothiazide 50-12.5 mg (HYZAAR) 50-12.5 mg per tablet; Take 1 tablet by mouth once daily.  Dispense: 90 tablet; Refill: 3    3. Routine medical exam  Overview:  Patient presents for a wellness visit  No new c/o today  Please refer to initial intake notes for screening/preventive measures  All histories and immunization schedule reviewed with patient        Assessment & Plan:   I gave the patient written recommendations re the outstanding vaccinations.  Flu vaccine given today ?  I gave her an order for a Mammo          4. Abnormal stool test  Overview:  + cologuard in Dec      Assessment & Plan:  Ref to GI             Plan:       1. Need for prophylactic vaccination and inoculation against influenza  -     Influenza - Quadrivalent *Preferred* (6 months+) (PF)    2. Essential hypertension, benign  Overview:  Hypertension ROS: taking medications as instructed, no medication side effects noted, no TIA's, no chest pain on exertion, no dyspnea on exertion and no swelling of ankles.   New concerns: none.   Hypertension Exam: BP noted to be well controlled today in office, S1, S2 normal, no gallop, no murmur, chest clear, no JVD, no HSM, no edema.   Lab review: orders written for new lab studies as appropriate; see orders.   Assessment:  Hypertension poorly controlled.   Plan: Orders and follow up as documented in patient record..      Orders:  -     losartan-hydrochlorothiazide 50-12.5 mg (HYZAAR) 50-12.5 mg per tablet; Take 1 tablet by mouth once daily.  Dispense: 90 tablet; Refill: 3    3. Routine medical exam  Overview:  Patient presents for a wellness visit  No new c/o today  Please refer to initial intake notes for screening/preventive measures  All histories and immunization schedule reviewed with patient        Assessment & Plan:   I gave the patient written recommendations re the  outstanding vaccinations.  Flu vaccine given today ?  I gave her an order for a Mammo          4. Abnormal stool test  Overview:  + cologuard in Dec      Assessment & Plan:  Ref to GI

## 2024-02-15 ENCOUNTER — TELEPHONE (OUTPATIENT)
Dept: FAMILY MEDICINE | Facility: CLINIC | Age: 59
End: 2024-02-15
Payer: COMMERCIAL

## 2024-02-15 NOTE — TELEPHONE ENCOUNTER
----- Message from Shannon Elan sent at 2/15/2024  4:47 PM CST -----  Contact: CHRISTIANO BROOKS  Type:  Mammogram    Caller is requesting to schedule their annual mammogram appointment.  Order is not listed in EPIC.  Please enter order and contact patient to schedule.  Name of Caller:CHRISTIANO BROOKS   Where would they like the mammogram performed?[facility]   Would the patient rather a call back or a response via [facility]? CALL  Best Call Back Number: [phone] / FAX # [phone]   Additional Information: PT SCHEDULE MON 02/19/24  3:45P  THANK YOU

## 2024-03-20 ENCOUNTER — PATIENT MESSAGE (OUTPATIENT)
Dept: ADMINISTRATIVE | Facility: HOSPITAL | Age: 59
End: 2024-03-20
Payer: COMMERCIAL

## 2024-04-03 ENCOUNTER — PATIENT MESSAGE (OUTPATIENT)
Dept: ADMINISTRATIVE | Facility: HOSPITAL | Age: 59
End: 2024-04-03
Payer: COMMERCIAL

## 2024-05-03 ENCOUNTER — PATIENT MESSAGE (OUTPATIENT)
Dept: ADMINISTRATIVE | Facility: HOSPITAL | Age: 59
End: 2024-05-03
Payer: COMMERCIAL

## 2024-05-13 PROBLEM — Z00.00 ROUTINE MEDICAL EXAM: Status: RESOLVED | Noted: 2023-03-06 | Resolved: 2024-05-13

## 2024-06-06 ENCOUNTER — PATIENT MESSAGE (OUTPATIENT)
Dept: ADMINISTRATIVE | Facility: HOSPITAL | Age: 59
End: 2024-06-06
Payer: COMMERCIAL

## 2024-07-27 ENCOUNTER — PATIENT MESSAGE (OUTPATIENT)
Dept: ADMINISTRATIVE | Facility: HOSPITAL | Age: 59
End: 2024-07-27
Payer: COMMERCIAL

## 2024-08-04 RX ORDER — LEVOTHYROXINE SODIUM 100 UG/1
100 TABLET ORAL
Qty: 90 TABLET | Refills: 1 | Status: SHIPPED | OUTPATIENT
Start: 2024-08-04 | End: 2024-08-08 | Stop reason: SDUPTHER

## 2024-08-08 ENCOUNTER — OFFICE VISIT (OUTPATIENT)
Dept: FAMILY MEDICINE | Facility: CLINIC | Age: 59
End: 2024-08-08
Payer: COMMERCIAL

## 2024-08-08 VITALS
DIASTOLIC BLOOD PRESSURE: 80 MMHG | RESPIRATION RATE: 18 BRPM | HEART RATE: 70 BPM | WEIGHT: 164.38 LBS | SYSTOLIC BLOOD PRESSURE: 128 MMHG | BODY MASS INDEX: 29.12 KG/M2 | OXYGEN SATURATION: 98 % | HEIGHT: 63 IN

## 2024-08-08 DIAGNOSIS — D22.9 ATYPICAL MOLE: Primary | ICD-10-CM

## 2024-08-08 DIAGNOSIS — E03.9 PRIMARY HYPOTHYROIDISM: ICD-10-CM

## 2024-08-08 DIAGNOSIS — I10 BENIGN HYPERTENSION: ICD-10-CM

## 2024-08-08 DIAGNOSIS — I10 ESSENTIAL HYPERTENSION, BENIGN: ICD-10-CM

## 2024-08-08 DIAGNOSIS — D22.9 CHANGE IN COLOR OF SKIN MOLE: ICD-10-CM

## 2024-08-08 DIAGNOSIS — Z13.220 ENCOUNTER FOR SCREENING FOR LIPID DISORDER: ICD-10-CM

## 2024-08-08 RX ORDER — LEVOTHYROXINE SODIUM 100 UG/1
100 TABLET ORAL
Qty: 90 TABLET | Refills: 1 | Status: SHIPPED | OUTPATIENT
Start: 2024-08-08 | End: 2025-02-04

## 2024-11-25 DIAGNOSIS — I10 ESSENTIAL HYPERTENSION, BENIGN: ICD-10-CM

## 2024-11-25 RX ORDER — LOSARTAN POTASSIUM AND HYDROCHLOROTHIAZIDE 12.5; 5 MG/1; MG/1
1 TABLET ORAL
Qty: 90 TABLET | Refills: 0 | Status: SHIPPED | OUTPATIENT
Start: 2024-11-25

## 2024-11-25 NOTE — TELEPHONE ENCOUNTER
Refill Decision Note   Tierney SalehChristiano  is requesting a refill authorization.  Brief Assessment and Rationale for Refill:  Approve     Medication Therapy Plan:  FLOS    Medication Reconciliation Completed: No   Comments:     No Care Gaps recommended.     Note composed:7:51 AM 11/25/2024

## 2024-11-25 NOTE — TELEPHONE ENCOUNTER
Care Due:                  Date            Visit Type   Department     Provider  --------------------------------------------------------------------------------                                EP -                              PRIMARY      [facility]  Last Visit: 08-      CARE ([facility])   ALISON Medina                              EP -                              PRIMARY      [facility]  Next Visit: 02-      CARE ([facility])   ALISON Medina                                                            Last  Test          Frequency    Reason                     Performed    Due Date  --------------------------------------------------------------------------------    CMP.........  12 months..  losartan-hydrochlorothiaz  02- 01-                             paulina......................    TSH.........  12 months..  levothyroxine............  02- 01-    Health [facility] Embedded Care Due Messages. Reference number: [id number].   11/25/2024 7:14:14 AM CST

## 2025-02-13 ENCOUNTER — PATIENT MESSAGE (OUTPATIENT)
Dept: INTERNAL MEDICINE | Facility: CLINIC | Age: 60
End: 2025-02-13
Payer: COMMERCIAL

## 2025-02-13 ENCOUNTER — LAB VISIT (OUTPATIENT)
Dept: LAB | Facility: CLINIC | Age: 60
End: 2025-02-13
Payer: COMMERCIAL

## 2025-02-13 DIAGNOSIS — I10 ESSENTIAL HYPERTENSION, BENIGN: ICD-10-CM

## 2025-02-13 DIAGNOSIS — Z13.220 ENCOUNTER FOR SCREENING FOR LIPID DISORDER: ICD-10-CM

## 2025-02-13 DIAGNOSIS — E03.9 PRIMARY HYPOTHYROIDISM: ICD-10-CM

## 2025-02-13 LAB
ALBUMIN SERPL BCP-MCNC: 3.8 G/DL (ref 3.5–5.2)
ALP SERPL-CCNC: 98 U/L (ref 40–150)
ALT SERPL W/O P-5'-P-CCNC: 9 U/L (ref 10–44)
ANION GAP SERPL CALC-SCNC: 8 MMOL/L (ref 8–16)
AST SERPL-CCNC: 13 U/L (ref 10–40)
BILIRUB SERPL-MCNC: 0.5 MG/DL (ref 0.1–1)
BUN SERPL-MCNC: 12 MG/DL (ref 8–23)
CALCIUM SERPL-MCNC: 9.2 MG/DL (ref 8.7–10.5)
CHLORIDE SERPL-SCNC: 104 MMOL/L (ref 95–110)
CHOLEST SERPL-MCNC: 193 MG/DL (ref 120–199)
CHOLEST/HDLC SERPL: 3.7 {RATIO} (ref 2–5)
CO2 SERPL-SCNC: 27 MMOL/L (ref 23–29)
CREAT SERPL-MCNC: 0.8 MG/DL (ref 0.5–1.4)
EST. GFR  (NO RACE VARIABLE): >60 ML/MIN/1.73 M^2
GLUCOSE SERPL-MCNC: 99 MG/DL (ref 70–110)
HDLC SERPL-MCNC: 52 MG/DL (ref 40–75)
HDLC SERPL: 26.9 % (ref 20–50)
LDLC SERPL CALC-MCNC: 124 MG/DL (ref 63–159)
NONHDLC SERPL-MCNC: 141 MG/DL
POTASSIUM SERPL-SCNC: 3.7 MMOL/L (ref 3.5–5.1)
PROT SERPL-MCNC: 6.8 G/DL (ref 6–8.4)
SODIUM SERPL-SCNC: 139 MMOL/L (ref 136–145)
TRIGL SERPL-MCNC: 85 MG/DL (ref 30–150)
TSH SERPL DL<=0.005 MIU/L-ACNC: 1.51 UIU/ML (ref 0.4–4)

## 2025-02-13 PROCEDURE — 84443 ASSAY THYROID STIM HORMONE: CPT | Performed by: INTERNAL MEDICINE

## 2025-02-13 PROCEDURE — 80061 LIPID PANEL: CPT | Performed by: INTERNAL MEDICINE

## 2025-02-13 PROCEDURE — 80053 COMPREHEN METABOLIC PANEL: CPT | Performed by: INTERNAL MEDICINE

## 2025-02-14 ENCOUNTER — OFFICE VISIT (OUTPATIENT)
Dept: FAMILY MEDICINE | Facility: CLINIC | Age: 60
End: 2025-02-14
Payer: COMMERCIAL

## 2025-02-14 ENCOUNTER — TELEPHONE (OUTPATIENT)
Dept: FAMILY MEDICINE | Facility: CLINIC | Age: 60
End: 2025-02-14
Payer: COMMERCIAL

## 2025-02-14 VITALS
BODY MASS INDEX: 27.64 KG/M2 | OXYGEN SATURATION: 98 % | DIASTOLIC BLOOD PRESSURE: 74 MMHG | HEIGHT: 63 IN | SYSTOLIC BLOOD PRESSURE: 116 MMHG | WEIGHT: 156 LBS | HEART RATE: 68 BPM

## 2025-02-14 DIAGNOSIS — I10 ESSENTIAL HYPERTENSION, BENIGN: ICD-10-CM

## 2025-02-14 DIAGNOSIS — Z11.3 SCREEN FOR STD (SEXUALLY TRANSMITTED DISEASE): ICD-10-CM

## 2025-02-14 DIAGNOSIS — Z00.00 WELLNESS EXAMINATION: ICD-10-CM

## 2025-02-14 DIAGNOSIS — Z12.31 OTHER SCREENING MAMMOGRAM: Primary | ICD-10-CM

## 2025-02-14 DIAGNOSIS — D22.9 SKIN MOLE: ICD-10-CM

## 2025-02-14 DIAGNOSIS — G44.209 TENSION HEADACHE: ICD-10-CM

## 2025-02-14 DIAGNOSIS — E03.9 PRIMARY HYPOTHYROIDISM: ICD-10-CM

## 2025-02-14 RX ORDER — LEVOTHYROXINE SODIUM 100 UG/1
100 TABLET ORAL
Qty: 90 TABLET | Refills: 1 | Status: SHIPPED | OUTPATIENT
Start: 2025-02-14 | End: 2025-08-13

## 2025-02-14 RX ORDER — LOSARTAN POTASSIUM AND HYDROCHLOROTHIAZIDE 12.5; 5 MG/1; MG/1
1 TABLET ORAL DAILY
Qty: 90 TABLET | Refills: 1 | Status: SHIPPED | OUTPATIENT
Start: 2025-02-14 | End: 2025-08-13

## 2025-02-14 NOTE — ASSESSMENT & PLAN NOTE
Reorder Mammo  Ref for a Pap with Dr Harris  Diet , wt loss , exercise d/w patient   I gave the patient written recommendations re the outstanding vaccinations.  Screen for routine HIV

## 2025-02-14 NOTE — TELEPHONE ENCOUNTER
----- Message from Bettie sent at 2/14/2025  4:08 PM CST -----  Type:  RX Refill Request    Who Called: pt    Refill or New Rx:refill    RX Name and Strength:levothyroxine (SYNTHROID) 100 MCG tablet -  needs to be name brand   losartan-hydrochlorothiazide 50-12.5 mg (HYZAAR) 50-12.5 mg per tablet    How is the patient currently taking it? (ex. 1XDay):as directed    Is this a 30 day or 90 day RX:30    Preferred Pharmacy with phone number:    [id number] - [place], [place] - [address]  [address]  [place] [address]  Phone: [phone] Fax: [phone]        Local or Mail Order:local    Ordering Provider:aliyah    Would the patient rather a call back or a response via [facility]? Call back    Best Call Back Number:[phone]      Additional Information:       Please call Back to advise. Thanks!

## 2025-02-14 NOTE — PROGRESS NOTES
Subjective:       Patient ID: Tierney Alvarado is a 59 y.o. female.    Chief Complaint: Annual Exam (Patient is here for a annaul exam.), Skin Tags, and Tension Headache      History of Present Illness    CHIEF COMPLAINT:  Tierney presents for a regular follow-up visit and to request a refill of her Synthroid medication.    HPI:  Tierney reports needing a refill of Synthroid, having taken her last dose this morning. She had labs done yesterday. She acknowledges a slight increase in her cholesterol, attributing it to recent holiday eating and increased dessert consumption. She had a colonoscopy several months ago following a positive Solomons-Guard test, during which polyps were removed. She has been amenorrheic for several years. Regarding her blood pressure medication, she admits to occasionally missing doses, estimating it occurs about once weekly. She mentions increased walking recently but does not exercise regularly, though she considers herself fairly active.    She declines a flu vaccine today. She denies having received RSV, pneumonia, shingles, and COVID-19 vaccines.    MEDICATIONS:  Tierney is on Synthroid for her thyroid condition and some blood pressure medication.    MEDICAL HISTORY:  Tierney has a history of hypothyroidism, hyperlipidemia, and hypertension. Tierney has gone through menopause. She has not had a hysterectomy. It has been an extended time since her last Pap screening.    SURGICAL HISTORY:  Several months ago, the patient underwent a colonoscopy during which polyps were removed.    TEST RESULTS:  On the 13th, the patient underwent several labs. Her chemistry panel, kidney function test, and urinalysis results were all within normal limits. The glucose level was 99 mg/dL, and cholesterol was 193 mg/dL, which showed an increase of 15-16 points from the previous test. The thyroid function test revealed a TSH of 1.5, which is within the normal range. Tierney's Solomons-guard test came back positive for a rectal  polyp.    IMAGING:  In February, the patient underwent a mammogram, and the results were within normal limits.         Review of Systems   Constitutional:  Negative for activity change, fever and unexpected weight change.   Respiratory: Negative.     Cardiovascular: Negative.    Integumentary:  Positive for mole/lesion.   Neurological:  Positive for headaches.         Objective:      Physical Exam  Vitals and nursing note reviewed.   Constitutional:       Appearance: Normal appearance.   Cardiovascular:      Rate and Rhythm: Normal rate and regular rhythm.   Pulmonary:      Effort: Pulmonary effort is normal.      Breath sounds: Normal breath sounds.   Musculoskeletal:      Cervical back: Normal range of motion and neck supple.   Skin:     Comments: She has a 1 by 2 cms raised dark mole like lesion of LL abd part   Neurological:      General: No focal deficit present.      Mental Status: She is alert.         Assessment:       1. Other screening mammogram  -     Mammo Digital Screening Bilat; Future; Expected date: 02/14/2025    2. Essential hypertension, benign  Overview:  Hypertension ROS: taking medications as instructed, no medication side effects noted, no TIA's, no chest pain on exertion, no dyspnea on exertion and no swelling of ankles.   New concerns: none.   Hypertension Exam: BP noted to be well controlled today in office, S1, S2 normal, no gallop, no murmur, chest clear, no JVD, no HSM, no edema.   Lab review: orders written for new lab studies as appropriate; see orders.   Assessment:  Hypertension poorly controlled.   Plan: Orders and follow up as documented in patient record..      Assessment & Plan:  Refill [facility]B-[facility]    Orders:  -     Comprehensive Metabolic Panel; Future; Expected date: 02/14/2025  -     losartan-hydrochlorothiazide 50-12.5 mg (HYZAAR) 50-12.5 mg per tablet; Take 1 tablet by mouth once daily.  Dispense: 90 tablet; Refill: 1    3. Screen for STD (sexually transmitted disease)  -     HIV  1/2 Ag/Ab (4th Gen); Future; Expected date: 08/14/2025    4. Primary hypothyroidism  Overview:  Thyroid ROS: denies fatigue, weight changes, heat/cold intolerance, bowel/skin changes or CVS symptoms.   No results found for: TSH  Exam: thyroid is normal in size without nodules or tenderness.   Assessment:  hypothyroidism well controlled.   Plan: current treatment plan effective, no change in therapy.      Assessment & Plan:  Refill  TSH : nl    Orders:  -     levothyroxine (SYNTHROID) 100 MCG tablet; Take 1 tablet (100 mcg total) by mouth before breakfast.  Dispense: 90 tablet; Refill: 1    5. Wellness examination  Overview:  Patient presents for a wellness visit  No new c/o today  Please refer to initial intake notes for screening/preventive measures  All histories and immunization schedule reviewed with patient        Assessment & Plan:  Reorder Mammo  Ref for a Pap with Dr Harris  Diet , wt loss , exercise d/w patient   I gave the patient written recommendations re the outstanding vaccinations.  Screen for routine HIV        6. Skin mole  Overview:  Newly discovered in past 6m  Grew bigger  L groin    Assessment & Plan:  I ref her last Aug to Derm but she didn't go  Reorder the referral  Monitor      7. Tension headache  Overview:  Happened few times a year with change of temperature with a    Assessment & Plan:  Get UDS  Refill fioricet after that and a  check  Contract signed             Plan:       Assessment & Plan    IMPRESSION:  - Reviewed recent lab results: chemistry panel within normal limits, cholesterol slightly elevated at 193 but still under 200, thyroid function optimal at 1.5  - Noted patient's history of positive Cologuard test and subsequent colonoscopy with polyp removal  - Assessed medication needs for Synthroid and blood pressure medication  - Evaluated vaccination status, noting multiple missing immunizations including flu, RSV, pneumonia, shingles, and COVID    THYROID DISORDER:  -  Reviewed the patient's thyroid function test results from recent labs.  - Noted that the thyroid function test result is 1.5, which is considered optimal.  - Refilled Synthroid at current dose, which is maintaining thyroid levels in the middle of the normal range.  - Advised the patient to continue with the current medication regimen.    HYPERLIPIDEMIA:  - Evaluated cholesterol levels from recent labs.  - Noted an increase of 15-16 points in cholesterol level to 193, which remains under 200.  - Attributed the increase in cholesterol levels to holiday eating habits.  - Recommend maintaining a healthy diet and regular exercise to manage cholesterol levels.    COLON POLYPS:  - Acknowledged the patient's history of colon polyps detected by Concrete-Guard test and subsequent colonoscopy.  - Noted that polyps were removed during a colonoscopy several months ago.  - Advised the patient on the importance of regular follow-up screenings as per guidelines.    HYPERTENSION:  - Discussed the patient's adherence to blood pressure medication, noting occasional missed doses (about once a week).  - Inquired about the patient's physical activity levels for blood pressure management.  - Refilled blood pressure medication with 11 days of supply remaining.  - Arranged to send a refill prescription to [facility] pharmacy.  - Educated the patient on the importance of consistent medication adherence for optimal blood pressure control.    MENOPAUSE:  - Confirmed the patient's menopausal state, noting absence of menstrual cycles for several years.  - Inquired about the presence of uterus and need for pap smears.    BREAST CANCER SCREENING:  - Discussed the importance of regular mammograms and current recommendations for annual screening.  - Ordered a mammogram.  - Instructed the patient to contact the office to schedule the mammogram using the provided number in the visit summary.    WEIGHT MANAGEMENT:  - Tierney to continue current level of physical  activity, including recent increase in walking.    INFLUENZA VACCINATION:  - Explained the significance of staying up-to-date with vaccinations, particularly during flu season.    LABS:  - Follow up in 6 months for routine labs, including sugar, Hep C, and HIV screening.       Tierney was seen today for annual exam, skin tags and tension headache.    Diagnoses and all orders for this visit:    Other screening mammogram  -     Mammo Digital Screening Bilat; Future    Essential hypertension, benign  -     Comprehensive Metabolic Panel; Future  -     losartan-hydrochlorothiazide 50-12.5 mg (HYZAAR) 50-12.5 mg per tablet; Take 1 tablet by mouth once daily.    Screen for STD (sexually transmitted disease)  -     HIV 1/2 Ag/Ab (4th Gen); Future    Primary hypothyroidism  -     levothyroxine (SYNTHROID) 100 MCG tablet; Take 1 tablet (100 mcg total) by mouth before breakfast.    Wellness examination    Skin mole    Tension headache

## 2025-02-16 DIAGNOSIS — E03.9 PRIMARY HYPOTHYROIDISM: ICD-10-CM

## 2025-02-16 RX ORDER — LEVOTHYROXINE SODIUM 100 UG/1
100 TABLET ORAL EVERY MORNING
Qty: 90 TABLET | Refills: 3 | Status: SHIPPED | OUTPATIENT
Start: 2025-02-16

## 2025-02-16 NOTE — TELEPHONE ENCOUNTER
No care due was identified.  Health [facility] Embedded Care Due Messages. Reference number: [id number].   2/16/2025 12:07:37 AM CST

## 2025-02-16 NOTE — TELEPHONE ENCOUNTER
Refill Decision Note   Tierney Alvarado  is requesting a refill authorization.  Brief Assessment and Rationale for Refill:  Approve     Medication Therapy Plan:         Comments:     Note composed:4:38 PM 02/16/2025             Appointments     Last Visit   2/14/2025 Juancho Medina MD   Next Visit   8/4/2025 Juancho Medina MD

## 2025-03-21 ENCOUNTER — OFFICE VISIT (OUTPATIENT)
Dept: FAMILY MEDICINE | Facility: CLINIC | Age: 60
End: 2025-03-21
Payer: COMMERCIAL

## 2025-03-21 VITALS
BODY MASS INDEX: 27.82 KG/M2 | HEART RATE: 65 BPM | DIASTOLIC BLOOD PRESSURE: 76 MMHG | HEIGHT: 63 IN | WEIGHT: 157 LBS | OXYGEN SATURATION: 100 % | SYSTOLIC BLOOD PRESSURE: 112 MMHG

## 2025-03-21 DIAGNOSIS — Z12.4 CERVICAL CANCER SCREENING: Primary | ICD-10-CM

## 2025-03-21 NOTE — PATIENT INSTRUCTIONS
Zachariah Monet,     If you are due for any health screening(s) below please notify me so we can arrange them to be ordered and scheduled. Most healthy patients at your age complete them, but you are free to accept or refuse.     If you can't do it, I'll definitely understand. If you can, I'd certainly appreciate it!    Tests to Keep You Healthy    Mammogram: ORDERED BUT NOT SCHEDULED  Colon Cancer Screening: Met on 4/12/2024  Cervical Cancer Screening: Met on 7/8/2022  Last Blood Pressure <= 139/89 (3/21/2025): Yes      Schedule your breast cancer screening today     Breast cancer is the second most common cancer in women,  and the second leading cause of death from cancer. Mammograms can detect breast cancer early, which significantly increases the chances of curing the cancer.       Our records indicate that you may be overdue for breast cancer screening. Cancer screenings save lives, so schedule yours today to stay healthy.     If you recently had a mammogram performed outside of [facility], please let your Health care team know so that they can update your health record.

## 2025-03-21 NOTE — PROGRESS NOTES
[facility] - [place], [place]    Family Medicine Office Visit    Subjective     Patient ID: Tierney Alvarado is a 59 y.o. female who presents to clinic today to follow up.     Medical history, surgical history, medications, allergies, and social history were reviewed and updated.     Chief Complaint: Gynecologic Exam    Gynecologic Exam        She presents today to complete cervical cancer screening.  Last Pap smear was several years ago.  Does remember 1 abnormal Pap smear proximally 10-15 years ago.  Reported that she would have a procedure following this, but is unsure what was done.  Last menstrual cycle was a proximally 5 years ago.    Vitals:    03/21/25 1404   BP: 112/76   Pulse: 65      Wt Readings from Last 3 Encounters:   03/21/25 1404 71.2 kg (157 lb)   02/14/25 0957 70.8 kg (156 lb)   08/08/24 1425 74.6 kg (164 lb 6.4 oz)      Review of Systems    Review of Systems otherwise negative unless specified above.        Objective     Physical Exam  Vitals reviewed. Exam conducted with a chaperone present.   Constitutional:       General: She is not in acute distress.     Appearance: Normal appearance.   HENT:      Head: Normocephalic and atraumatic.      Nose: Nose normal.      Mouth/Throat:      Mouth: Mucous membranes are moist.   Cardiovascular:      Rate and Rhythm: Normal rate and regular rhythm.      Heart sounds: No murmur heard.  Pulmonary:      Effort: Pulmonary effort is normal. No respiratory distress.      Breath sounds: Normal breath sounds.   Chest:   Breasts:     Right: No nipple discharge, skin change or tenderness.      Left: No nipple discharge, skin change or tenderness.   Musculoskeletal:         General: Normal range of motion.   Skin:     General: Skin is warm and dry.   Neurological:      General: No focal deficit present.      Mental Status: She is alert and oriented to person, place, and time.   Psychiatric:         Mood and Affect: Mood normal.          Behavior: Behavior normal.       Pelvic exam: normal external genitalia, vulva, vagina, cervix, uterus and adnexa, VULVA: normal appearing vulva with no masses, tenderness or lesions, VAGINA: normal appearing vagina with normal color and discharge, no lesions, CERVIX: normal appearing cervix without discharge or lesions, PAP: Pap smear done today, exam chaperoned by Shonda Clinton MA.       Assessment and Plan     Current Outpatient Medications   Medication Instructions    losartan-hydrochlorothiazide 50-12.5 mg (HYZAAR) 50-12.5 mg per tablet 1 tablet, Oral, Daily    SYNTHROID 100 mcg, Oral, Every morning        1. Cervical cancer screening  -     Liquid-Based Pap Smear, Screening  -     HPV High Risk Genotypes, PCR        Completed breast exam and cervical cancer screening today.  We will notify patient of results when available.  Otherwise, recommend that she keep scheduled follow up PCP.         Follow up if symptoms worsen or fail to improve, for Keep scheduled follow up with PCP.    Questions were invited and answered. No other acute concerns at this time. Will plan to follow up as above or sooner if needed.     Denise Harris DO  03/21/2025 2:32 PM       This dictation has been generated using Modal Fluency Dictation. Some phonetic errors may occur. Please contact author for clarification if needed.

## 2025-03-28 ENCOUNTER — RESULTS FOLLOW-UP (OUTPATIENT)
Dept: FAMILY MEDICINE | Facility: CLINIC | Age: 60
End: 2025-03-28

## 2025-03-28 DIAGNOSIS — N76.0 BACTERIAL VAGINOSIS: Primary | ICD-10-CM

## 2025-03-28 DIAGNOSIS — B96.89 BACTERIAL VAGINOSIS: Primary | ICD-10-CM

## 2025-03-28 RX ORDER — METRONIDAZOLE 500 MG/1
500 TABLET ORAL EVERY 12 HOURS
Qty: 14 TABLET | Refills: 0 | Status: SHIPPED | OUTPATIENT
Start: 2025-03-28 | End: 2025-04-04

## 2025-05-20 DIAGNOSIS — E03.9 PRIMARY HYPOTHYROIDISM: ICD-10-CM

## 2025-05-20 RX ORDER — LEVOTHYROXINE SODIUM 100 UG/1
100 TABLET ORAL EVERY MORNING
Qty: 90 TABLET | Refills: 2 | Status: SHIPPED | OUTPATIENT
Start: 2025-05-20

## 2025-05-20 NOTE — TELEPHONE ENCOUNTER
No care due was identified.  [facility] Embedded Care Due Messages. Reference number: [id number].   5/20/2025 6:09:25 AM CDT

## 2025-05-20 NOTE — TELEPHONE ENCOUNTER
Refill Decision Note   Tierney Christiano  is requesting a refill authorization.  Brief Assessment and Rationale for Refill:  Approve     Medication Therapy Plan:         Comments:     Note composed:10:41 AM 05/20/2025

## 2025-07-01 ENCOUNTER — PATIENT MESSAGE (OUTPATIENT)
Dept: FAMILY MEDICINE | Facility: CLINIC | Age: 60
End: 2025-07-01
Payer: COMMERCIAL

## 2025-07-01 ENCOUNTER — TELEPHONE (OUTPATIENT)
Dept: FAMILY MEDICINE | Facility: CLINIC | Age: 60
End: 2025-07-01
Payer: COMMERCIAL

## 2025-07-01 NOTE — TELEPHONE ENCOUNTER
Called to reschedule patient's upcoming appointment, as Dr. Gonzalez is no longer with our practice. There was no answer, so a voicemail was left requesting a call back to reschedule with another provider.

## 2025-07-30 ENCOUNTER — TELEPHONE (OUTPATIENT)
Dept: FAMILY MEDICINE | Facility: CLINIC | Age: 60
End: 2025-07-30
Payer: COMMERCIAL

## 2025-07-30 NOTE — TELEPHONE ENCOUNTER
Copied from CRM #[id number]. Topic: Appointments - Appointment Access  >> Jul 30, 2025  3:24 PM Miky wrote:  Type:  Sooner Apoointment Request    Caller is requesting a sooner appointment.  Caller declined first available appointment listed below.  Caller will not accept being placed on the waitlist and is requesting a message be sent to doctor.  Name of Caller:Pt  When is the first available appointment?9/9/2025  Symptoms: pt did not disclosed   Would the patient rather a call back or a response via [facility]? Call   Best Call Back Number:[phone]    Additional Information: Pt was a former pt of Juancho Medina MD she had an appt on 8/14/2025 which was canceled due to him leaving Pt would like to be seen on that date or sometime near that time. Pt states she would like to be seen by Denise Harris DO for refills and medical concerns.

## 2025-08-04 ENCOUNTER — TELEPHONE (OUTPATIENT)
Dept: FAMILY MEDICINE | Facility: CLINIC | Age: 60
End: 2025-08-04
Payer: COMMERCIAL

## 2025-08-11 ENCOUNTER — OFFICE VISIT (OUTPATIENT)
Dept: FAMILY MEDICINE | Facility: CLINIC | Age: 60
End: 2025-08-11
Payer: COMMERCIAL

## 2025-08-11 VITALS
OXYGEN SATURATION: 98 % | HEIGHT: 63 IN | BODY MASS INDEX: 27.65 KG/M2 | HEART RATE: 67 BPM | SYSTOLIC BLOOD PRESSURE: 114 MMHG | WEIGHT: 156.06 LBS | DIASTOLIC BLOOD PRESSURE: 72 MMHG

## 2025-08-11 DIAGNOSIS — Z12.31 ENCOUNTER FOR SCREENING MAMMOGRAM FOR MALIGNANT NEOPLASM OF BREAST: ICD-10-CM

## 2025-08-11 DIAGNOSIS — E03.9 PRIMARY HYPOTHYROIDISM: ICD-10-CM

## 2025-08-11 DIAGNOSIS — I10 PRIMARY HYPERTENSION: Primary | ICD-10-CM

## 2025-08-11 PROBLEM — Z00.00 WELLNESS EXAMINATION: Status: RESOLVED | Noted: 2023-03-06 | Resolved: 2025-08-11

## 2025-08-11 PROBLEM — K62.1 RECTAL POLYP: Status: ACTIVE | Noted: 2024-03-04

## 2025-08-11 PROBLEM — D22.9 SKIN MOLE: Status: RESOLVED | Noted: 2024-08-08 | Resolved: 2025-08-11

## 2025-08-11 PROBLEM — R19.5 ABNORMAL STOOL TEST: Status: RESOLVED | Noted: 2024-02-08 | Resolved: 2025-08-11

## 2025-08-11 PROBLEM — G44.209 TENSION HEADACHE: Status: RESOLVED | Noted: 2023-02-07 | Resolved: 2025-08-11

## 2025-08-11 PROBLEM — U07.1 COVID: Status: RESOLVED | Noted: 2023-03-03 | Resolved: 2025-08-11

## 2025-08-11 PROCEDURE — 3078F DIAST BP <80 MM HG: CPT | Mod: S$GLB,,, | Performed by: STUDENT IN AN ORGANIZED HEALTH CARE EDUCATION/TRAINING PROGRAM

## 2025-08-11 PROCEDURE — 99214 OFFICE O/P EST MOD 30 MIN: CPT | Mod: S$GLB,,, | Performed by: STUDENT IN AN ORGANIZED HEALTH CARE EDUCATION/TRAINING PROGRAM

## 2025-08-11 PROCEDURE — G2211 COMPLEX E/M VISIT ADD ON: HCPCS | Mod: S$GLB,,, | Performed by: STUDENT IN AN ORGANIZED HEALTH CARE EDUCATION/TRAINING PROGRAM

## 2025-08-11 PROCEDURE — 1159F MED LIST DOCD IN RCRD: CPT | Mod: S$GLB,,, | Performed by: STUDENT IN AN ORGANIZED HEALTH CARE EDUCATION/TRAINING PROGRAM

## 2025-08-11 PROCEDURE — 1160F RVW MEDS BY RX/DR IN RCRD: CPT | Mod: S$GLB,,, | Performed by: STUDENT IN AN ORGANIZED HEALTH CARE EDUCATION/TRAINING PROGRAM

## 2025-08-11 PROCEDURE — 84443 ASSAY THYROID STIM HORMONE: CPT | Performed by: STUDENT IN AN ORGANIZED HEALTH CARE EDUCATION/TRAINING PROGRAM

## 2025-08-11 PROCEDURE — 3074F SYST BP LT 130 MM HG: CPT | Mod: S$GLB,,, | Performed by: STUDENT IN AN ORGANIZED HEALTH CARE EDUCATION/TRAINING PROGRAM

## 2025-08-11 PROCEDURE — 3008F BODY MASS INDEX DOCD: CPT | Mod: S$GLB,,, | Performed by: STUDENT IN AN ORGANIZED HEALTH CARE EDUCATION/TRAINING PROGRAM

## 2025-08-11 RX ORDER — LOSARTAN POTASSIUM AND HYDROCHLOROTHIAZIDE 12.5; 5 MG/1; MG/1
1 TABLET ORAL DAILY
Qty: 90 TABLET | Refills: 1 | Status: SHIPPED | OUTPATIENT
Start: 2025-08-11 | End: 2026-02-07

## 2025-08-11 RX ORDER — BUTALBITAL, ACETAMINOPHEN AND CAFFEINE 300; 40; 50 MG/1; MG/1; MG/1
CAPSULE ORAL
COMMUNITY
Start: 2025-08-11

## 2025-08-12 ENCOUNTER — RESULTS FOLLOW-UP (OUTPATIENT)
Dept: FAMILY MEDICINE | Facility: CLINIC | Age: 60
End: 2025-08-12
Payer: COMMERCIAL